# Patient Record
Sex: MALE | Race: WHITE | Employment: FULL TIME | URBAN - METROPOLITAN AREA
[De-identification: names, ages, dates, MRNs, and addresses within clinical notes are randomized per-mention and may not be internally consistent; named-entity substitution may affect disease eponyms.]

---

## 2022-05-03 ENCOUNTER — OFFICE VISIT (OUTPATIENT)
Dept: PRIMARY CARE CLINIC | Age: 42
End: 2022-05-03
Payer: OTHER GOVERNMENT

## 2022-05-03 VITALS
BODY MASS INDEX: 31.06 KG/M2 | TEMPERATURE: 98.4 F | WEIGHT: 242 LBS | OXYGEN SATURATION: 99 % | DIASTOLIC BLOOD PRESSURE: 81 MMHG | HEIGHT: 74 IN | HEART RATE: 91 BPM | SYSTOLIC BLOOD PRESSURE: 137 MMHG

## 2022-05-03 DIAGNOSIS — R06.09 DOE (DYSPNEA ON EXERTION): Primary | ICD-10-CM

## 2022-05-03 DIAGNOSIS — Z86.69 HISTORY OF MIGRAINE: ICD-10-CM

## 2022-05-03 DIAGNOSIS — E66.09 CLASS 1 OBESITY DUE TO EXCESS CALORIES WITHOUT SERIOUS COMORBIDITY WITH BODY MASS INDEX (BMI) OF 31.0 TO 31.9 IN ADULT: ICD-10-CM

## 2022-05-03 DIAGNOSIS — Z13.220 SCREENING FOR LIPID DISORDERS: ICD-10-CM

## 2022-05-03 DIAGNOSIS — R41.89 SLUGGISHNESS: ICD-10-CM

## 2022-05-03 DIAGNOSIS — F43.10 PTSD (POST-TRAUMATIC STRESS DISORDER): ICD-10-CM

## 2022-05-03 DIAGNOSIS — R00.2 HEART PALPITATIONS: ICD-10-CM

## 2022-05-03 DIAGNOSIS — F98.8 ATTENTION DEFICIT DISORDER (ADD) IN ADULT: ICD-10-CM

## 2022-05-03 DIAGNOSIS — R42 EPISODIC LIGHTHEADEDNESS: ICD-10-CM

## 2022-05-03 DIAGNOSIS — Z86.16 HISTORY OF COVID-19: ICD-10-CM

## 2022-05-03 DIAGNOSIS — J30.9 CHRONIC ALLERGIC RHINITIS: ICD-10-CM

## 2022-05-03 PROBLEM — F33.0 MAJOR DEPRESSIVE DISORDER, RECURRENT EPISODE, MILD (HCC): Status: ACTIVE | Noted: 2022-01-27

## 2022-05-03 PROBLEM — F90.9 ATTENTION DEFICIT HYPERACTIVITY DISORDER (ADHD): Status: ACTIVE | Noted: 2022-01-27

## 2022-05-03 PROCEDURE — 99204 OFFICE O/P NEW MOD 45 MIN: CPT | Performed by: FAMILY MEDICINE

## 2022-05-03 RX ORDER — PRAZOSIN HYDROCHLORIDE 1 MG/1
1 CAPSULE ORAL NIGHTLY
COMMUNITY

## 2022-05-03 RX ORDER — LORATADINE 10 MG/1
10 TABLET ORAL DAILY
COMMUNITY
Start: 2022-01-27

## 2022-05-03 RX ORDER — SUMATRIPTAN 50 MG/1
50 TABLET, FILM COATED ORAL PRN
COMMUNITY

## 2022-05-03 RX ORDER — DEXTROAMPHETAMINE SACCHARATE, AMPHETAMINE ASPARTATE, DEXTROAMPHETAMINE SULFATE AND AMPHETAMINE SULFATE 5; 5; 5; 5 MG/1; MG/1; MG/1; MG/1
20 TABLET ORAL DAILY
COMMUNITY
Start: 2021-10-15

## 2022-05-03 RX ORDER — FLUTICASONE PROPIONATE 50 MCG
2 SPRAY, SUSPENSION (ML) NASAL 2 TIMES DAILY
COMMUNITY
Start: 2022-01-27

## 2022-05-03 RX ORDER — SERTRALINE HYDROCHLORIDE 100 MG/1
100 TABLET, FILM COATED ORAL DAILY
COMMUNITY

## 2022-05-03 SDOH — ECONOMIC STABILITY: FOOD INSECURITY: WITHIN THE PAST 12 MONTHS, YOU WORRIED THAT YOUR FOOD WOULD RUN OUT BEFORE YOU GOT MONEY TO BUY MORE.: NEVER TRUE

## 2022-05-03 SDOH — ECONOMIC STABILITY: FOOD INSECURITY: WITHIN THE PAST 12 MONTHS, THE FOOD YOU BOUGHT JUST DIDN'T LAST AND YOU DIDN'T HAVE MONEY TO GET MORE.: NEVER TRUE

## 2022-05-03 ASSESSMENT — SOCIAL DETERMINANTS OF HEALTH (SDOH): HOW HARD IS IT FOR YOU TO PAY FOR THE VERY BASICS LIKE FOOD, HOUSING, MEDICAL CARE, AND HEATING?: NOT HARD AT ALL

## 2022-05-03 ASSESSMENT — PATIENT HEALTH QUESTIONNAIRE - PHQ9
SUM OF ALL RESPONSES TO PHQ QUESTIONS 1-9: 0
1. LITTLE INTEREST OR PLEASURE IN DOING THINGS: 0
SUM OF ALL RESPONSES TO PHQ QUESTIONS 1-9: 0
SUM OF ALL RESPONSES TO PHQ QUESTIONS 1-9: 0
SUM OF ALL RESPONSES TO PHQ9 QUESTIONS 1 & 2: 0
2. FEELING DOWN, DEPRESSED OR HOPELESS: 0
SUM OF ALL RESPONSES TO PHQ QUESTIONS 1-9: 0

## 2022-05-03 NOTE — PROGRESS NOTES
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 Relocated from [facility] recently. He is originally from [place], [place]. He does report a few medications that he has been on for some time. He has been treated for PTSD, allergies and ADD. What has him concerned currently is:    Has been experiencing for a few months feeling like with exertion (stairs) feeling very winded. When he gets up to fast feels brief feeling of heart pounding/racing - lasts 30-60 seconds. Used to run a lot, but now these symptoms limit exercise, does endorse some deconditioning. No syncopal events. These symptoms are happening more frequently. No major new stressors. No labs for at least 1 year. No heart eval in the past. Had asthma as a child, but no issues breathing in adulthood. Change in fatigue. No change in urine/thirst. He had COVID (vaccinated with 2 shots) around [address] Moderate illness reported with that. Current meds and dosages have been stable. Normally used Adderall M-F. No correlation with above to this med. PHQ Scores 5/3/2022   PHQ2 Score 0   PHQ9 Score 0     Interpretation of Total Score Depression Severity: 1-4 = Minimal depression, 5-9 = Mild depression, 10-14 = Moderate depression, 15-19 = Moderately severe depression, 20-27 = Severe depression     Prior to Visit Medications    Medication Sig Taking? Authorizing Provider   amphetamine-dextroamphetamine (ADDERALL) 20 MG tablet Take 20 mg by mouth daily.  Yes Historical Provider, MD   sertraline (ZOLOFT) 100 MG tablet Take 100 mg by mouth daily Yes Historical Provider, MD   prazosin (MINIPRESS) 1 MG capsule Take 1 mg by mouth nightly Yes Historical Provider, MD   fluticasone (FLONASE) 50 MCG/ACT nasal spray 2 sprays by Nasal route 2 times daily Yes Historical Provider, MD   loratadine (CLARITIN) 10 MG tablet Take 10 mg by mouth daily Yes Historical Provider, MD   SUMAtriptan (IMITREX) 50 MG tablet Take 50 mg by mouth as needed Yes Historical Provider, MD       Past Medical History:   Diagnosis Date    Attention deficit disorder (ADD) in adult 2022    Chronic allergic rhinitis 2022    History of COVID-19 2022 5/3/2022    History of migraine 5/3/2022    PTSD with nightmares 5/3/2022        Social History     Tobacco Use    Smoking status: Former Smoker     Packs/day: 0.50     Years: 15.00     Pack years: 7.50     Types: Cigarettes     Quit date: 5/3/2020     Years since quittin.0    Smokeless tobacco: Current User   Vaping Use    Vaping Use: Every day    Substances: Nicotine   Substance Use Topics    Alcohol use: Yes     Comment: social    Drug use: Never        Past Surgical History:   Procedure Laterality Date    APPENDECTOMY      SHOULDER SURGERY Right     SALP        No Known Allergies     Family History   Problem Relation Age of Onset    High Blood Pressure Mother     Other Father         Pain Med addiction    No Known Problems Brother     Cancer Paternal Grandmother         Leukemia    Heart Disease Paternal Grandfather         Patient's past medical history, surgical history, family history, medications, and allergies  were all reviewed and updated as appropriate today. Review of systems per HPI above, otherwise negative. /81   Pulse 91   Temp 98.4 °F (36.9 °C)   Ht 6' 2\" (1.88 m)   Wt 242 lb (109.8 kg)   SpO2 99%   BMI 31.07 kg/m²      Physical Exam  Vitals reviewed. Constitutional:       General: He is not in acute distress. Appearance: Normal appearance. He is well-developed. HENT:      Head: Normocephalic and atraumatic.       Right Ear: Tympanic membrane and ear canal normal. No drainage. No middle ear effusion. Tympanic membrane is not erythematous. Left Ear: Tympanic membrane and ear canal normal. No drainage. No middle ear effusion. Tympanic membrane is not erythematous. Nose: Nose normal. No rhinorrhea. Mouth/Throat:      Mouth: Mucous membranes are moist.      Pharynx: No oropharyngeal exudate or posterior oropharyngeal erythema. Eyes:      Extraocular Movements: Extraocular movements intact. Pupils: Pupils are equal, round, and reactive to light. Neck:      Thyroid: No thyromegaly. Cardiovascular:      Rate and Rhythm: Normal rate and regular rhythm. Heart sounds: No murmur heard. Pulmonary:      Effort: Pulmonary effort is normal.      Breath sounds: Normal breath sounds. No wheezing. Abdominal:      General: Bowel sounds are normal.      Palpations: Abdomen is soft. There is no mass. Tenderness: There is no abdominal tenderness. Musculoskeletal:         General: No swelling. Normal range of motion. Cervical back: Neck supple. Lymphadenopathy:      Cervical: No cervical adenopathy. Skin:     General: Skin is warm and dry. Findings: No rash. Neurological:      General: No focal deficit present. Mental Status: He is alert and oriented to person, place, and time. Cranial Nerves: No cranial nerve deficit. Psychiatric:         Mood and Affect: Mood normal.         Assessment:  Encounter Diagnoses   Name Primary?  MIDDLETON (dyspnea on exertion) Yes    Episodic lightheadedness     Heart palpitations     History of COVID-19 01/2022     Sluggishness     Attention deficit disorder (ADD) in adult     PTSD with nightmares     Chronic allergic rhinitis     History of migraine     Screening for lipid disorders     Class 1 obesity due to excess calories without serious comorbidity with body mass index (BMI) of 31.0 to 31.9 in adult        Plan:  1.  MIDDLETON (dyspnea on exertion)  NP to practice with various vague symptoms as described above in HPI. Exam is normal. By hx hard to determine any specific driving force for this, but I think eval is warranted. Will get labs first. We discussed possible Cardiology eval, but will await labs first.   - CBC with Auto Differential; Future  - Comprehensive Metabolic Panel, Fasting; Future    2. Episodic lightheadedness  See above. - CBC with Auto Differential; Future  - Comprehensive Metabolic Panel, Fasting; Future    3. Heart palpitations  - CBC with Auto Differential; Future  - Comprehensive Metabolic Panel, Fasting; Future  - TSH; Future  - T4, Free; Future  - T3, Free; Future    4. History of COVID-19 01/2022  Had a moderate COVID illness in [place] this year, symptoms above started about 2 months after recovery. Consider COVID as trigger for this? 5. Sluggishness  - Hemoglobin A1C; Future  - CBC with Auto Differential; Future  - Comprehensive Metabolic Panel, Fasting; Future  - TSH; Future  - T4, Free; Future  - T3, Free; Future    6. Attention deficit disorder (ADD) in adult  Continue per specialist.    7. PTSD with nightmares  Will agree to refill Prazosin for him. 8. Chronic allergic rhinitis  Symptoms c/w allergies - environmental VS seasonal. Extensive education provided today regarding trigger avoidance and safe management with OTC medications. Provided answers to all questions today. Given precautions regarding new/progressive symptoms that are not responsive to medications that were discussed today. Warning signs of infection to ears, nose and sinuses given. 9. History of migraine    10. Screening for lipid disorders  - Lipid, Fasting; Future    11. Class 1 obesity due to excess calories without serious comorbidity with body mass index (BMI) of 31.0 to 31.9 in adult  Patient was asked about current diet and exercise habits, and personalized advice was provided regarding recommended lifestyle changes.   Patient's comorbid health conditions associated with elevated BMI were discussed, as well as the likely benefits weight loss. Based upon patient's motivation to change behavior, the following plan was agreed upon to work toward a weight loss goal - increasing CV activity, reducing carbs/calories and healthy eating habits. Educational materials for weight loss were provided. Patient will follow-up with myself at designated time in future. - Hemoglobin A1C; Future  - Lipid, Fasting; Future      Return if symptoms worsen or fail to improve. Vonda Perez, DO     Please note that this chart was generated using dragon dictation software. Although every effort was made to ensure the accuracy of this automated transcription, some errors in transcription may have occurred.

## 2022-05-16 ENCOUNTER — TELEPHONE (OUTPATIENT)
Dept: PRIMARY CARE CLINIC | Age: 42
End: 2022-05-16

## 2022-05-16 ENCOUNTER — HOSPITAL ENCOUNTER (EMERGENCY)
Age: 42
Discharge: HOME | End: 2022-05-17
Attending: EMERGENCY MEDICINE
Payer: OTHER GOVERNMENT

## 2022-05-16 DIAGNOSIS — D64.9 ANEMIA, UNSPECIFIED TYPE: Primary | ICD-10-CM

## 2022-05-16 LAB
A/G RATIO: 1.6 (ref 1.1–2.2)
ABO/RH: NORMAL
ALBUMIN SERPL-MCNC: 4.6 G/DL (ref 3.4–5)
ALP BLD-CCNC: 52 U/L (ref 40–129)
ALT SERPL-CCNC: 12 U/L (ref 10–40)
ANION GAP SERPL CALCULATED.3IONS-SCNC: 10 MMOL/L (ref 3–16)
ANTIBODY SCREEN: NORMAL
AST SERPL-CCNC: 18 U/L (ref 15–37)
BASOPHILS ABSOLUTE: 0.1 K/UL (ref 0–0.2)
BASOPHILS RELATIVE PERCENT: 3.1 %
BILIRUB SERPL-MCNC: 0.6 MG/DL (ref 0–1)
BLOOD BANK DISPENSE STATUS: NORMAL
BLOOD BANK PRODUCT CODE: NORMAL
BLOOD SMEAR REVIEW: NORMAL
BPU ID: NORMAL
BUN BLDV-MCNC: 10 MG/DL (ref 7–20)
CALCIUM SERPL-MCNC: 9 MG/DL (ref 8.3–10.6)
CHLORIDE BLD-SCNC: 102 MMOL/L (ref 99–110)
CO2: 24 MMOL/L (ref 21–32)
CREAT SERPL-MCNC: 0.7 MG/DL (ref 0.9–1.3)
DESCRIPTION BLOOD BANK: NORMAL
EKG ATRIAL RATE: 79 BPM
EKG DIAGNOSIS: NORMAL
EKG P AXIS: 26 DEGREES
EKG P-R INTERVAL: 158 MS
EKG Q-T INTERVAL: 384 MS
EKG QRS DURATION: 90 MS
EKG QTC CALCULATION (BAZETT): 440 MS
EKG R AXIS: 1 DEGREES
EKG T AXIS: 29 DEGREES
EKG VENTRICULAR RATE: 79 BPM
EOSINOPHILS ABSOLUTE: 0.1 K/UL (ref 0–0.6)
EOSINOPHILS RELATIVE PERCENT: 1.9 %
FERRITIN: 21.7 NG/ML (ref 30–400)
GFR AFRICAN AMERICAN: >60
GFR NON-AFRICAN AMERICAN: >60
GLUCOSE BLD-MCNC: 93 MG/DL (ref 70–99)
HCT VFR BLD CALC: 20.3 % (ref 40.5–52.5)
HCT VFR BLD CALC: 24 % (ref 40.5–52.5)
HEMOGLOBIN: 5.5 G/DL (ref 13.5–17.5)
HEMOGLOBIN: 7 G/DL (ref 13.5–17.5)
LACTATE DEHYDROGENASE: 157 U/L (ref 100–190)
LYMPHOCYTES ABSOLUTE: 1.7 K/UL (ref 1–5.1)
LYMPHOCYTES RELATIVE PERCENT: 37.5 %
MAGNESIUM: 1.9 MG/DL (ref 1.8–2.4)
MCH RBC QN AUTO: 14.3 PG (ref 26–34)
MCHC RBC AUTO-ENTMCNC: 26.9 G/DL (ref 31–36)
MCV RBC AUTO: 53.3 FL (ref 80–100)
MONOCYTES ABSOLUTE: 0.4 K/UL (ref 0–1.3)
MONOCYTES RELATIVE PERCENT: 8.2 %
NEUTROPHILS ABSOLUTE: 2.2 K/UL (ref 1.7–7.7)
NEUTROPHILS RELATIVE PERCENT: 49.3 %
PDW BLD-RTO: 22.5 % (ref 12.4–15.4)
PLATELET # BLD: 288 K/UL (ref 135–450)
PMV BLD AUTO: 8.4 FL (ref 5–10.5)
POTASSIUM REFLEX MAGNESIUM: 3.5 MMOL/L (ref 3.5–5.1)
RBC # BLD: 3.82 M/UL (ref 4.2–5.9)
SODIUM BLD-SCNC: 136 MMOL/L (ref 136–145)
TOTAL PROTEIN: 7.5 G/DL (ref 6.4–8.2)
WBC # BLD: 4.5 K/UL (ref 4–11)

## 2022-05-16 PROCEDURE — 83010 ASSAY OF HAPTOGLOBIN QUANT: CPT

## 2022-05-16 PROCEDURE — 83615 LACTATE (LD) (LDH) ENZYME: CPT

## 2022-05-16 PROCEDURE — 83540 ASSAY OF IRON: CPT

## 2022-05-16 PROCEDURE — 83550 IRON BINDING TEST: CPT

## 2022-05-16 PROCEDURE — 86923 COMPATIBILITY TEST ELECTRIC: CPT

## 2022-05-16 PROCEDURE — 36430 TRANSFUSION BLD/BLD COMPNT: CPT

## 2022-05-16 PROCEDURE — 86901 BLOOD TYPING SEROLOGIC RH(D): CPT

## 2022-05-16 PROCEDURE — 85018 HEMOGLOBIN: CPT

## 2022-05-16 PROCEDURE — 85014 HEMATOCRIT: CPT

## 2022-05-16 PROCEDURE — 80053 COMPREHEN METABOLIC PANEL: CPT

## 2022-05-16 PROCEDURE — 83655 ASSAY OF LEAD: CPT

## 2022-05-16 PROCEDURE — 85025 COMPLETE CBC W/AUTO DIFF WBC: CPT

## 2022-05-16 PROCEDURE — 93005 ELECTROCARDIOGRAM TRACING: CPT | Performed by: EMERGENCY MEDICINE

## 2022-05-16 PROCEDURE — 99285 EMERGENCY DEPT VISIT HI MDM: CPT

## 2022-05-16 PROCEDURE — 82728 ASSAY OF FERRITIN: CPT

## 2022-05-16 PROCEDURE — P9016 RBC LEUKOCYTES REDUCED: HCPCS

## 2022-05-16 PROCEDURE — 86850 RBC ANTIBODY SCREEN: CPT

## 2022-05-16 PROCEDURE — 86900 BLOOD TYPING SEROLOGIC ABO: CPT

## 2022-05-16 PROCEDURE — 83735 ASSAY OF MAGNESIUM: CPT

## 2022-05-16 PROCEDURE — 36415 COLL VENOUS BLD VENIPUNCTURE: CPT

## 2022-05-16 RX ORDER — SODIUM CHLORIDE 9 MG/ML
INJECTION, SOLUTION INTRAVENOUS PRN
Status: DISCONTINUED | OUTPATIENT
Start: 2022-05-16 | End: 2022-05-17 | Stop reason: HOSPADM

## 2022-05-16 ASSESSMENT — PAIN - FUNCTIONAL ASSESSMENT: PAIN_FUNCTIONAL_ASSESSMENT: NONE - DENIES PAIN

## 2022-05-16 NOTE — ED NOTES
Blood completed will redraw H&H per order.  . Pt updated on [address], RN  05/16/22 [id number]

## 2022-05-16 NOTE — TELEPHONE ENCOUNTER
Spoke to pt - he will be going to [facility] ER. No new symptoms to report. Spoke to provider at ER.

## 2022-05-16 NOTE — CONSENT
Informed Consent for Blood Component Transfusion Note    I have discussed with the patient the rationale for blood component transfusion; its benefits in treating or preventing fatigue, organ damage, or death; and its risk which includes mild transfusion reactions, rare risk of blood borne infection, or more serious but rare reactions. I have discussed the alternatives to transfusion, including the risk and consequences of not receiving transfusion. The patient had an opportunity to ask questions and had agreed to proceed with transfusion of blood components.     Electronically signed by Chelsea Hernández MD on 5/16/22 at 1:25 PM EDT

## 2022-05-16 NOTE — ED NOTES
1st unit of PRBCs started [address] [facility], [address] [place] [address]  05/16/22 [id number]

## 2022-05-16 NOTE — ED PROVIDER NOTES
[address]          ATTENDING PHYSICIAN NOTE       Date of evaluation: 5/16/2022    Chief Complaint     Anemia (per pt hgb 5. something per PCP recent lab work. Pt states was getting dizzy spells and shortly out of breathe)      History of Present Illness     Christina Paniagua is a 46 y.o. male with past medical history significant for migraines, previous COVID infection in January of this year, and hemorrhoids who presents to the emergency department in referral from his primary care provider for further evaluation of newly diagnosed anemia. Patient establish care with a new primary care provider on May 3 and was at that time complaining of shortness of breath and palpitations. Symptoms had been ongoing for several months, worse with exertion, and exercise limiting. Patient denied chest pain, syncope. He admits that he has had issues with bleeding hemorrhoids, but has been taking fiber supplements and over the past 6 months has only had about 1 episode of bleeding (blood staining the water) about 1 week ago, but none since. Otherwise reports no type of bleeding. Reports that his mother also had acquired transfusions over her life, but this was also attributed in her case to bleeding hemorrhoids. Both his current residence of 1 year and the previous residence before that were newer constructions with no known lead paint. Does not follow any particular dietary restrictions. Review of Systems     Review of Systems    Pertinent positive and negative findings as documented in the HPI, otherwise other systems were reviewed and were negative. Past Medical, Surgical, Family, and Social History     He has a past medical history of Attention deficit disorder (ADD) in adult, Bleeding hemorrhoid, Chronic allergic rhinitis, History of COVID-19 01/2022, History of migraine, and PTSD with nightmares.   He has a past surgical history that includes shoulder surgery (Right) and Appendectomy. His family history includes Cancer in his paternal grandmother; Heart Disease in his paternal grandfather; High Blood Pressure in his mother; No Known Problems in his brother; Other in his father. He reports that he quit smoking about 2 years ago. His smoking use included cigarettes. He has a 7.50 pack-year smoking history. He uses smokeless tobacco. He reports current alcohol use. He reports that he does not use drugs. Medications     Previous Medications    AMPHETAMINE-DEXTROAMPHETAMINE (ADDERALL) 20 MG TABLET    Take 20 mg by mouth daily. FLUTICASONE (FLONASE) 50 MCG/ACT NASAL SPRAY    2 sprays by Nasal route 2 times daily    LORATADINE (CLARITIN) 10 MG TABLET    Take 10 mg by mouth daily    PRAZOSIN (MINIPRESS) 1 MG CAPSULE    Take 1 mg by mouth nightly    SERTRALINE (ZOLOFT) 100 MG TABLET    Take 100 mg by mouth daily    SUMATRIPTAN (IMITREX) 50 MG TABLET    Take 50 mg by mouth as needed       Allergies     He has No Known Allergies. Physical Exam     INITIAL VITALS: BP: (!) 150/88, Temp: 98 °F (36.7 °C), Pulse: 86, Resp: 18, SpO2: 98 %   Physical Exam    General: Well developed and well nourished. No acute distress. HEENT: NCAT, PERRL, moist mucous membranes. Conjunctival and sublingual pallor. Neck: Trachea midline, neck supple with FROM  Heart: Regular rate and rhythm. 2/6 systolic murmur best heard at right second interspace. Lungs: Clear to auscultation in all fields bilaterally. Normal effort. Abd: Nondistended, no signs of trauma. No tenderness to palpation, guarding, or rebound. External hemorrhoid is noted with no active bleeding. No gross blood on digital rectal exam.  MSK: No obvious deformities. Range of motion grossly intact. Extremities: No cyanosis or edema. Peripheral pulses intact. Skin: No rashes, abrasions, contusions, or lacerations noted. Neuro: Alert and oriented, moves all extremities spontaneously. No gross motor or sensory deficits.   Psych: Mood and 21.7 (L) 30.0 - 400.0 ng/mL   Magnesium   Result Value Ref Range    Magnesium 1.90 1.80 - 2.40 mg/dL   Hemoglobin and Hematocrit   Result Value Ref Range    Hemoglobin 7.0 (L) 13.5 - 17.5 g/dL    Hematocrit 24.0 (L) 40.5 - 52.5 %   EKG 12 Lead   Result Value Ref Range    Ventricular Rate 79 BPM    Atrial Rate 79 BPM    P-R Interval 158 ms    QRS Duration 90 ms    Q-T Interval 384 ms    QTc Calculation (Bazett) 440 ms    P Axis 26 degrees    R Axis 1 degrees    T Axis 29 degrees    Diagnosis       EKG performed in ER and to be interpreted by ER physician. Confirmed by MD, ER ([id number]),  Baljeet Mckee ([id number]) on 5/16/2022 1:10:43 PM   TYPE AND SCREEN   Result Value Ref Range    ABO/Rh O POS     Antibody Screen NEG    PREPARE RBC (CROSSMATCH), 2 Units   Result Value Ref Range    Product Code Blood Bank [id number]     Description Blood Bank Red Blood Cells, Apheresis, Leuko-reduced     Unit Number [id number]     Dispense Status Blood Bank transfused     Product Code Blood Bank [id number]     Description Blood Bank Red Blood Cells, Leuko-reduced     Unit Number [id number]     Dispense Status Blood Bank transfused     Product Code Blood Bank [id number]     Description Blood Bank Red Blood Cells, Apheresis, Leuko-reduced     Unit Number [id number]     Dispense Status Blood Bank transfused        ED BEDSIDE ULTRASOUND:  None    RECENT VITALS:  BP: (!) 128/91,Temp: 98.1 °F (36.7 °C), Pulse: 71, Resp: 20, SpO2: 98 %     Procedures     None    ED Course     Nursing Notes, Past Medical Hx, Past Surgical Hx, Social Hx,Allergies, and Family Hx were reviewed.     patient was given the following medications:  Orders Placed This Encounter   Medications    0.9 % sodium chloride infusion       CONSULTS:  IP CONSULT TO PRIMARY CARE PROVIDER    MEDICAL DECISIONMAKING / ASSESSMENT / Carmen Maria Victoria is a 46 y.o. male with past medical history of hemorrhoids who presents to the emergency department with a new diagnosis of anemia. On presentation the patient was afebrile, hemodynamically stable, and in no acute distress. His exam was notable for some conjunctival and sublingual pallor, soft and nontender abdomen, external hemorrhoid but no gross blood on digital rectal exam.    I verified that the low hemoglobin was a true result by repeating the CBC. Hemoglobin is 5.5. MCV is also low as is ferritin. The remaining iron studies are in process and not expected to result during this ED visit but should facilitate outpatient work-up. Hemolysis labs are negative. At this point with a microcytic anemia, low ferritin, and negative hemolysis, this appears to be consistent with an iron deficiency anemia. Likely secondary to chronic ongoing losses from his hemorrhoids, but there are elements of the work-up that remain. I discussed his case over the phone with the patient's primary care nurse practitioner. They stated that he would be able to be seen within the next 2 days for repeat check. I felt that this was a reasonable plan given his hemodynamic stability and history indicating that this is likely a chronic process developing over time. Patient was initially transfused 2 units of RBCs and climbed from hemoglobin of 5.5 up to 7. At that point I ordered a third unit, which is being transfused at the time of shift change. At this time I am going off service will be signing out care to the oncoming provider. The responsibilities of the to reassess hemoglobin after transfusion and ultimate disposition, which as stated above I expect will be appropriate for home with return precautions and outpatient follow-up. Clinical Impression     1.  Anemia, unspecified type        Disposition     PATIENT REFERRED TO:  Alton Guzman, [address] [place] [address]  [phone]    Schedule an appointment as soon as possible for a visit in 2 days        DISCHARGE MEDICATIONS:  New Prescriptions    No medications on file       DISPOSITION  Pending at the time of shift change        Kaleb Gardner MD  05/16/22 5451

## 2022-05-17 ENCOUNTER — TELEPHONE (OUTPATIENT)
Dept: PRIMARY CARE CLINIC | Age: 42
End: 2022-05-17

## 2022-05-17 VITALS
OXYGEN SATURATION: 98 % | BODY MASS INDEX: 30.8 KG/M2 | HEIGHT: 74 IN | WEIGHT: 240 LBS | DIASTOLIC BLOOD PRESSURE: 90 MMHG | SYSTOLIC BLOOD PRESSURE: 125 MMHG | TEMPERATURE: 98.5 F | RESPIRATION RATE: 13 BRPM | HEART RATE: 60 BPM

## 2022-05-17 DIAGNOSIS — D64.9 SEVERE ANEMIA: Primary | ICD-10-CM

## 2022-05-17 LAB
HAPTOGLOBIN: 89 MG/DL (ref 30–200)
HCT VFR BLD CALC: 26.4 % (ref 40.5–52.5)
HEMOGLOBIN: 7.6 G/DL (ref 13.5–17.5)
IRON SATURATION: 3 % (ref 20–50)
IRON: 14 UG/DL (ref 59–158)
TOTAL IRON BINDING CAPACITY: 475 UG/DL (ref 260–445)

## 2022-05-17 PROCEDURE — 85018 HEMOGLOBIN: CPT

## 2022-05-17 PROCEDURE — 85014 HEMATOCRIT: CPT

## 2022-05-17 PROCEDURE — 36415 COLL VENOUS BLD VENIPUNCTURE: CPT

## 2022-05-17 RX ORDER — LANOLIN ALCOHOL/MO/W.PET/CERES
325 CREAM (GRAM) TOPICAL
Qty: 90 TABLET | Refills: 0 | Status: SHIPPED | OUTPATIENT
Start: 2022-05-17 | End: 2022-06-29 | Stop reason: SDUPTHER

## 2022-05-17 NOTE — ED NOTES
Discharge instructions provided to patient by RN at bedside. No further concerns addressed at this time.      Agustin Harris RN  05/17/22 2338

## 2022-05-17 NOTE — ED NOTES
RN spoke to lab on the phone as H & H not resulted at this time. RN and  found requisition not showing as it should.  aware and will run lab work accordingly.      Harika Bejarano RN  05/17/22 0715

## 2022-05-17 NOTE — TELEPHONE ENCOUNTER
Pt informed   He says he will try to do the labs at [facility] tomorrow between 7 and 7:30   Will then be scheduled for an appt here tomorrow afternoon -will call him back to schedule this .

## 2022-05-17 NOTE — TELEPHONE ENCOUNTER
Reviewed ER notes and labs - good improvements with the transfusion. What I would like to do before he gets out of town is have him go for STAT labs to repeat blood count tomorrow morning and seeing me a couple of hours later at earliest. Would like to have time for those to result and review. Let me know this can be coordinated and when I will see him. Will discuss f/u before he leaves town and come up with a game plan.

## 2022-05-17 NOTE — ED PROVIDER NOTES
[address] [facility] 71 ENCOUNTER          ATTENDING PHYSICIAN NOTE       Date of evaluation: 5/16/2022    ADDENDUM:      Care of this patient was assumed from Dr. Malou Mason.  The patient was seen for Anemia (per pt hgb 5. something per PCP recent lab work. Pt states was getting dizzy spells and shortly out of breathe)  . The patient's initial evaluation and plan have been discussed with the prior provider who initially evaluated the patient. Nursing Notes, Past Medical Hx, Past Surgical Hx, Social Hx, Allergies, and Family Hx were all reviewed. Patient is a 73-year-old male who presents to the emerge from for abnormal labs. Patient has noted increased fatigue and dyspnea on exertion and was seen by new primary care provider today and had blood work drawn that showed CBC with anemia 5.7. On repeat evaluation in the emerge from it was 5.5. Patient does note bleeding from an external hemorrhoid but otherwise no prior blood loss. Patient is hemodynamically stable. CBC shows a microcytic anemia that is most likely due to iron deficiency. Patient was given 2 units of red blood cells and repeat H&H after this was 7.0. At time of turnover, repeat H&H after third unit of packed red cells was pending. Diagnostic Results     EKG   EKG shows normal sinus rhythm with no acute ischemic abnormalities.     LABS:   Results for orders placed or performed during the hospital encounter of 05/16/22   CBC with Auto Differential   Result Value Ref Range    WBC 4.5 4.0 - 11.0 K/uL    RBC 3.82 (L) 4.20 - 5.90 M/uL    Hemoglobin 5.5 (LL) 13.5 - 17.5 g/dL    Hematocrit 20.3 (LL) 40.5 - 52.5 %    MCV 53.3 (L) 80.0 - 100.0 fL    MCH 14.3 (L) 26.0 - 34.0 pg    MCHC 26.9 (L) 31.0 - 36.0 g/dL    RDW 22.5 (H) 12.4 - 15.4 %    Platelets 052 689 - 494 K/uL    MPV 8.4 5.0 - 10.5 fL    Neutrophils % 49.3 %    Lymphocytes % 37.5 %    Monocytes % 8.2 %    Eosinophils % 1.9 %    Basophils % 3.1 %    Neutrophils Absolute 2.2 1.7 - 7.7 K/uL    Lymphocytes Absolute 1.7 1.0 - 5.1 K/uL    Monocytes Absolute 0.4 0.0 - 1.3 K/uL    Eosinophils Absolute 0.1 0.0 - 0.6 K/uL    Basophils Absolute 0.1 0.0 - 0.2 K/uL   Comprehensive Metabolic Panel w/ Reflex to MG   Result Value Ref Range    Sodium 136 136 - 145 mmol/L    Potassium reflex Magnesium 3.5 3.5 - 5.1 mmol/L    Chloride 102 99 - 110 mmol/L    CO2 24 21 - 32 mmol/L    Anion Gap 10 3 - 16    Glucose 93 70 - 99 mg/dL    BUN 10 7 - 20 mg/dL    CREATININE 0.7 (L) 0.9 - 1.3 mg/dL    GFR Non-African American >60 >60    GFR African American >60 >60    Calcium 9.0 8.3 - 10.6 mg/dL    Total Protein 7.5 6.4 - 8.2 g/dL    Albumin 4.6 3.4 - 5.0 g/dL    Albumin/Globulin Ratio 1.6 1.1 - 2.2    Total Bilirubin 0.6 0.0 - 1.0 mg/dL    Alkaline Phosphatase 52 40 - 129 U/L    ALT 12 10 - 40 U/L    AST 18 15 - 37 U/L   Lactate Dehydrogenase   Result Value Ref Range     100 - 190 U/L   Path Review, Smear   Result Value Ref Range    Blood Smear Review SLIDE READY    Ferritin   Result Value Ref Range    Ferritin 21.7 (L) 30.0 - 400.0 ng/mL   Magnesium   Result Value Ref Range    Magnesium 1.90 1.80 - 2.40 mg/dL   Hemoglobin and Hematocrit   Result Value Ref Range    Hemoglobin 7.0 (L) 13.5 - 17.5 g/dL    Hematocrit 24.0 (L) 40.5 - 52.5 %   Hemoglobin and Hematocrit   Result Value Ref Range    Hemoglobin 7.6 (L) 13.5 - 17.5 g/dL    Hematocrit 26.4 (L) 40.5 - 52.5 %   EKG 12 Lead   Result Value Ref Range    Ventricular Rate 79 BPM    Atrial Rate 79 BPM    P-R Interval 158 ms    QRS Duration 90 ms    Q-T Interval 384 ms    QTc Calculation (Bazett) 440 ms    P Axis 26 degrees    R Axis 1 degrees    T Axis 29 degrees    Diagnosis       EKG performed in ER and to be interpreted by ER physician. Confirmed by MD, ER ([id number]),  YUNIEL ANTUNEZ ([id number]) on 5/16/2022 1:10:43 PM   TYPE AND SCREEN   Result Value Ref Range    ABO/Rh O POS     Antibody Screen NEG    PREPARE RBC (CROSSMATCH), 2 Units   Result Value Ref Range    Product Code Blood Bank [id number]     Description Blood Bank Red Blood Cells, Apheresis, Leuko-reduced     Unit Number [id number]     Dispense Status Blood Bank transfused     Product Code Blood Bank [id number]     Description Blood Bank Red Blood Cells, Leuko-reduced     Unit Number [id number]     Dispense Status Blood Bank transfused     Product Code Blood Bank [id number]     Description Blood Bank Red Blood Cells, Apheresis, Leuko-reduced     Unit Number [id number]     Dispense Status Blood Bank transfused        RECENT VITALS:  BP: (!) 125/90, Temp: 98.5 °F (36.9 °C), Pulse: 60, Resp: 13, SpO2: 98 %     Procedures     N/A    ED Course     The patient was given the following medications:  Orders Placed This Encounter   Medications    0.9 % sodium chloride infusion    ferrous sulfate (FE TABS) 325 (65 Fe) MG EC tablet     Sig: Take 1 tablet by mouth 3 times daily (with meals)     Dispense:  90 tablet     Refill:  0       CONSULTS:  IP CONSULT TO PRIMARY CARE PROVIDER    MEDICAL DECISION MAKING / ASSESSMENT / Delmy Waleska is a 46 y.o. male who presents for abnormal hemoglobin in light of symptoms of fatigue and shortness of breath. Patient does note a hemorrhoid that will bleed but otherwise no obvious sources of blood loss. Patient has a microcytic anemia on exam which is likely due to iron deficiency. Patient was transfused with 3 units of packed red cells to a hemoglobin of 7.6. Since the patient has no active bleeding at this time and had appropriate response to transfusion and is hemodynamically stable I do not feel he needs admission to the hospital.  My colleague did speak with the patient's primary care provider and the patient will have close outpatient follow-up. I will start him on iron supplementation. Clinical Impression     1.  Anemia, unspecified type        Disposition     PATIENT REFERRED TO:  Elizabet Urban DO  [address] [place] [address]  [phone]    Schedule an appointment as soon as possible for a visit in 2 days        DISCHARGE MEDICATIONS:  New Prescriptions    FERROUS SULFATE (FE TABS) 325 (65 FE) MG EC TABLET    Take 1 tablet by mouth 3 times daily (with meals)       DISPOSITION Decision To Discharge 05/17/2022 02:41:07 AM       Donato Caal MD  05/17/22 1394

## 2022-05-18 ENCOUNTER — OFFICE VISIT (OUTPATIENT)
Dept: PRIMARY CARE CLINIC | Age: 42
End: 2022-05-18
Payer: OTHER GOVERNMENT

## 2022-05-18 VITALS
SYSTOLIC BLOOD PRESSURE: 133 MMHG | DIASTOLIC BLOOD PRESSURE: 64 MMHG | HEIGHT: 74 IN | OXYGEN SATURATION: 98 % | TEMPERATURE: 98 F | WEIGHT: 239 LBS | BODY MASS INDEX: 30.67 KG/M2 | HEART RATE: 94 BPM

## 2022-05-18 DIAGNOSIS — D50.9 IRON DEFICIENCY ANEMIA, UNSPECIFIED IRON DEFICIENCY ANEMIA TYPE: Primary | ICD-10-CM

## 2022-05-18 DIAGNOSIS — Z86.19 HISTORY OF HELICOBACTER PYLORI INFECTION: ICD-10-CM

## 2022-05-18 DIAGNOSIS — Z87.19 H/O HEMORRHOIDS: ICD-10-CM

## 2022-05-18 DIAGNOSIS — Z92.89 HISTORY OF RECENT BLOOD TRANSFUSION: ICD-10-CM

## 2022-05-18 DIAGNOSIS — K62.5 BRBPR (BRIGHT RED BLOOD PER RECTUM): ICD-10-CM

## 2022-05-18 DIAGNOSIS — D64.9 SEVERE ANEMIA: ICD-10-CM

## 2022-05-18 LAB
BASOPHILS ABSOLUTE: 0.2 K/UL (ref 0–0.2)
BASOPHILS RELATIVE PERCENT: 2.9 %
EOSINOPHILS ABSOLUTE: 0.2 K/UL (ref 0–0.6)
EOSINOPHILS RELATIVE PERCENT: 4.1 %
HCT VFR BLD CALC: 29.3 % (ref 40.5–52.5)
HEMATOLOGY PATH CONSULT: NO
HEMOGLOBIN: 8.5 G/DL (ref 13.5–17.5)
LYMPHOCYTES ABSOLUTE: 1.9 K/UL (ref 1–5.1)
LYMPHOCYTES RELATIVE PERCENT: 35.7 %
MCH RBC QN AUTO: 17.2 PG (ref 26–34)
MCHC RBC AUTO-ENTMCNC: 28.9 G/DL (ref 31–36)
MCV RBC AUTO: 59.7 FL (ref 80–100)
MONOCYTES ABSOLUTE: 0.5 K/UL (ref 0–1.3)
MONOCYTES RELATIVE PERCENT: 9.8 %
NEUTROPHILS ABSOLUTE: 2.6 K/UL (ref 1.7–7.7)
NEUTROPHILS RELATIVE PERCENT: 47.5 %
PDW BLD-RTO: 31.5 % (ref 12.4–15.4)
PLATELET # BLD: 287 K/UL (ref 135–450)
PMV BLD AUTO: 8.6 FL (ref 5–10.5)
RBC # BLD: 4.9 M/UL (ref 4.2–5.9)
WBC # BLD: 5.5 K/UL (ref 4–11)

## 2022-05-18 PROCEDURE — 99214 OFFICE O/P EST MOD 30 MIN: CPT | Performed by: FAMILY MEDICINE

## 2022-05-18 NOTE — PATIENT INSTRUCTIONS
[facility] Laboratory Locations - No appointment necessary. @ indicates the location is open Saturdays in addition to Monday through Friday. Call your preferred location for test preparation, business hours and other information you need. [facility] accepts [employer]. [facility]    @ [place] Lab Svcs. [address]   Ph: [phone] [facility] Lab Svcs. [address]   Ph: [phone]  @ [place] Lab Svcs. [address] [facility]   Ph: [phone]    [facility] Lab Svcs. [address]ovanny [address]   Ph: [phone] @ [place] [facility] Svcs. [address]  Ph: [phone] @ Cecilia [facility] Svcs. [address] [address] [facility] [address] Ricki Breen [place] [address]   Ph: [phone]    [place]   @ [facility] Lab Svcs. [address] [facility] Dominic Seals.[facility] [address]   Ph: [phone] [facility]. [address]  Ph: [address] [facility], [phone]   [facility] 30:  [address] Lab Svcs. 54 [facility]   Ph: [phone] [facility]. Lab Svcs.   [address] [facility], [facility] [address]   Ph: [phone]

## 2022-05-18 NOTE — Clinical Note
This is the young renetta that was severely anemic. Very nice renetta. Keep me posted!  Oswald Baron

## 2022-05-18 NOTE — PROGRESS NOTES
[address] [facility] [address] PRIMARY CARE  [address] [place] [id number]  Dept: [phone]  Dept Fax: [phone]     5/18/2022      Elba Juan Pablo   1980     Chief Complaint   Patient presents with    Follow-Up from Garett DINH  Pt comes in today for ER f/u. Was sent to ER 2 days ago for severe symptomatic anemia and received 3U PRBC. Tolerated this well. Started daily iron supplement. He is feeling better overall - much improved from last time we had seen him. This does seen to be a new issue for him, although years ago it sounds like he had a workup for anemia but it was mild at that time. Had a EGD/colonoscopy in Jan 2020 - told there was H pylori and treated for that. Heartburn present. Eats spicy foods. Does have hemorrhoids and does endorse frequent blood in water/with wiping. Hemoglobin   Date Value Ref Range Status   05/18/2022 8.5 (L) 13.5 - 17.5 g/dL Final     Comment:     Results confirmed, test repeated   05/17/2022 7.6 (L) 13.5 - 17.5 g/dL Final   05/16/2022 7.0 (L) 13.5 - 17.5 g/dL Final   05/16/2022 5.5 (LL) 13.5 - 17.5 g/dL Final   05/14/2022 5.7 (LL) 13.5 - 17.5 g/dL Final     Comment:     Results confirmed, test repeated     Hematocrit   Date Value Ref Range Status   05/18/2022 29.3 (L) 40.5 - 52.5 % Final   05/17/2022 26.4 (L) 40.5 - 52.5 % Final   05/16/2022 24.0 (L) 40.5 - 52.5 % Final   05/16/2022 20.3 (LL) 40.5 - 52.5 % Final   05/14/2022 21.2 (L) 40.5 - 52.5 % Final       PHQ Scores 5/3/2022   PHQ2 Score 0   PHQ9 Score 0     Interpretation of Total Score Depression Severity: 1-4 = Minimal depression, 5-9 = Mild depression, 10-14 = Moderate depression, 15-19 = Moderately severe depression, 20-27 = Severe depression     Prior to Visit Medications    Medication Sig Taking?  Authorizing Provider   ferrous sulfate (FE TABS) 325 (65 Fe) MG EC tablet Take 1 tablet by mouth 3 times daily (with meals) Yes Larry Hassan MD amphetamine-dextroamphetamine (ADDERALL) 20 MG tablet Take 20 mg by mouth daily.  Yes Historical Provider, MD   sertraline (ZOLOFT) 100 MG tablet Take 100 mg by mouth daily Yes Historical Provider, MD   prazosin (MINIPRESS) 1 MG capsule Take 1 mg by mouth nightly Yes Historical Provider, MD   fluticasone (FLONASE) 50 MCG/ACT nasal spray 2 sprays by Nasal route 2 times daily Yes Historical Provider, MD   loratadine (CLARITIN) 10 MG tablet Take 10 mg by mouth daily Yes Historical Provider, MD   SUMAtriptan (IMITREX) 50 MG tablet Take 50 mg by mouth as needed Yes Historical Provider, MD       Past Medical History:   Diagnosis Date    Attention deficit disorder (ADD) in adult 2022    Bleeding hemorrhoid     Chronic allergic rhinitis 2022    H/O hemorrhoids 2022    History of blood transfusion - 3 units in May 2022 2022    History of COVID-19 2022 5/3/2022    History of Helicobacter pylori infection 2022    History of migraine 5/3/2022    PTSD with nightmares 5/3/2022        Social History     Tobacco Use    Smoking status: Former Smoker     Packs/day: 0.50     Years: 15.00     Pack years: 7.50     Types: Cigarettes     Quit date: 5/3/2020     Years since quittin.0    Smokeless tobacco: Current User   Vaping Use    Vaping Use: Every day    Substances: Nicotine   Substance Use Topics    Alcohol use: Yes     Comment: social    Drug use: Never        Past Surgical History:   Procedure Laterality Date    APPENDECTOMY      SHOULDER SURGERY Right     SALP        No Known Allergies     Family History   Problem Relation Age of Onset    High Blood Pressure Mother     Other Father         Pain Med addiction    No Known Problems Brother     Cancer Paternal Grandmother         Leukemia    Heart Disease Paternal Grandfather         Patient's past medical history, surgical history, family history, medications, and allergies  were all reviewed and updated as appropriate today.    Review of Systems   Constitutional: Positive for fatigue (improved). Negative for fever and unexpected weight change. HENT: Negative for congestion, ear pain and sore throat. Eyes: Negative for pain, itching and visual disturbance. Respiratory: Negative for cough, shortness of breath and wheezing. Cardiovascular: Negative for chest pain, palpitations and leg swelling. Gastrointestinal: Positive for blood in stool. Negative for abdominal pain, constipation, diarrhea, nausea and vomiting. Endocrine: Negative for cold intolerance, heat intolerance, polydipsia and polyuria. Genitourinary: Negative for dysuria, frequency and hematuria. Musculoskeletal: Negative for arthralgias and joint swelling. Skin: Negative for rash. Neurological: Positive for dizziness (improved). Negative for headaches. /64 (Cuff Size: Medium Adult)   Pulse 94   Temp 98 °F (36.7 °C) (Temporal)   Ht 6' 2\" (1.88 m)   Wt 239 lb (108.4 kg)   SpO2 98% Comment: room air  BMI 30.69 kg/m²      Physical Exam  Vitals reviewed. Constitutional:       General: He is not in acute distress. HENT:      Head: Normocephalic. Nose: Nose normal.      Mouth/Throat:      Mouth: Mucous membranes are moist.   Eyes:      Extraocular Movements: Extraocular movements intact. Pupils: Pupils are equal, round, and reactive to light. Cardiovascular:      Rate and Rhythm: Normal rate and regular rhythm. Heart sounds: No murmur heard. Pulmonary:      Effort: Pulmonary effort is normal.      Breath sounds: Normal breath sounds. No wheezing. Abdominal:      General: Bowel sounds are normal. There is no distension. Palpations: Abdomen is soft. Tenderness: There is no abdominal tenderness. There is no guarding. Musculoskeletal:         General: No swelling or deformity. Cervical back: Neck supple. Lymphadenopathy:      Cervical: No cervical adenopathy.    Skin:     General: Skin is warm and dry.      Findings: No rash. Neurological:      Mental Status: He is alert and oriented to person, place, and time. Cranial Nerves: No cranial nerve deficit. Psychiatric:         Mood and Affect: Mood normal.         Behavior: Behavior is cooperative. Assessment:  Encounter Diagnoses   Name Primary?  Iron deficiency anemia, unspecified iron deficiency anemia type Yes    History of recent blood transfusion     H/O hemorrhoids     BRBPR (bright red blood per rectum)     History of Helicobacter pylori infection        Plan:  1. Iron deficiency anemia, unspecified iron deficiency anemia type  Severe - improved s/p transfusion. Continue oral iron. Need to identify cause for best short/long term plan. Will refer to HEME and GI. Given precautions and answered all questions. F/u in 2 months. - Hans Hines MD, Oncology, Ceferino Cool MD, Gastroenterology, [facility]    2. History of recent blood transfusion    3. H/O hemorrhoids  - ERVIN Su MD, Gastroenterology, [facility]    4. BRBPR (bright red blood per rectum)  - ERVIN Su MD, Gastroenterology, [facility]    5. History of Helicobacter pylori infection  - ERVIN Su MD, Gastroenterology, [facility]    Total time spent: Over 30 minutes. This includes time spent with the patient during the visit as well as time spent before and after the visit reviewing the chart, reviewing past/present studies and documenting the encounter. Lengthy discussion with pt and wife today. Answered a lot of questions for them. They are to travel to [facility] tomorrow for the weekend - counseled pt I believe this is safe, but be mindful of how he is feeling with elevation change. Return in about 6 weeks (around 6/29/2022) for F/U anemia. Kristin Benavides, DO     Please note that this chart was generated using dragon dictation software.   Although every effort was made to ensure the accuracy of this automated transcription, some errors in transcription may have occurred.

## 2022-05-19 LAB — LEAD LEVEL BLOOD: <2 UG/DL

## 2022-05-19 ASSESSMENT — ENCOUNTER SYMPTOMS
EYE ITCHING: 0
DIARRHEA: 0
WHEEZING: 0
NAUSEA: 0
CONSTIPATION: 0
ABDOMINAL PAIN: 0
SHORTNESS OF BREATH: 0
BLOOD IN STOOL: 1
VOMITING: 0
COUGH: 0
EYE PAIN: 0
SORE THROAT: 0

## 2022-06-01 ENCOUNTER — TELEPHONE (OUTPATIENT)
Dept: PRIMARY CARE CLINIC | Age: 42
End: 2022-06-01

## 2022-06-01 DIAGNOSIS — D50.9 IRON DEFICIENCY ANEMIA, UNSPECIFIED IRON DEFICIENCY ANEMIA TYPE: Primary | ICD-10-CM

## 2022-06-01 NOTE — TELEPHONE ENCOUNTER
Roscoe Ortiz from [facility] calling for  referral for pt to see Dr Kimberly Reilly    He says they cannot give the pt an appt until referral rec'd

## 2022-06-28 ENCOUNTER — TELEPHONE (OUTPATIENT)
Dept: PRIMARY CARE CLINIC | Age: 42
End: 2022-06-28

## 2022-06-28 NOTE — TELEPHONE ENCOUNTER
Pt's wife calling saying  referred pt to a gastro and today she rec'd a bill from the gastro, called [facility], and was told there was no referral done    She is asking for a referral to be done to [facility]

## 2022-06-29 RX ORDER — LANOLIN ALCOHOL/MO/W.PET/CERES
325 CREAM (GRAM) TOPICAL
Qty: 90 TABLET | Refills: 0 | Status: SHIPPED | OUTPATIENT
Start: 2022-06-29

## 2022-07-07 ENCOUNTER — OFFICE VISIT (OUTPATIENT)
Dept: PRIMARY CARE CLINIC | Age: 42
End: 2022-07-07
Payer: OTHER GOVERNMENT

## 2022-07-07 VITALS
WEIGHT: 238 LBS | DIASTOLIC BLOOD PRESSURE: 70 MMHG | TEMPERATURE: 98.1 F | HEART RATE: 80 BPM | OXYGEN SATURATION: 98 % | BODY MASS INDEX: 30.56 KG/M2 | SYSTOLIC BLOOD PRESSURE: 109 MMHG

## 2022-07-07 DIAGNOSIS — K64.8: ICD-10-CM

## 2022-07-07 DIAGNOSIS — R06.09 DOE (DYSPNEA ON EXERTION): ICD-10-CM

## 2022-07-07 DIAGNOSIS — D50.9 IRON DEFICIENCY ANEMIA, UNSPECIFIED IRON DEFICIENCY ANEMIA TYPE: Primary | ICD-10-CM

## 2022-07-07 DIAGNOSIS — R41.89 SLUGGISHNESS: ICD-10-CM

## 2022-07-07 PROCEDURE — 99213 OFFICE O/P EST LOW 20 MIN: CPT | Performed by: FAMILY MEDICINE

## 2022-07-07 NOTE — PATIENT INSTRUCTIONS
[facility] Laboratory Locations - No appointment necessary. @ indicates the location is open Saturdays in addition to Monday through Friday. Call your preferred location for test preparation, business hours and other information you need. [employer] accepts [employer]. [facility]    @ [place] Lab Svcs. 3 [facility] [address] [place] [address]. [place], [address]   Ph: [phone] [facility] Lab Svcs. [address]   Ph: [phone]  @ [facility] Lab Svcs. [address] [facility]   Ph: [phone]    [facility] Lab Svcs. [address]   Ph: [phone] @ [place] Lab Svcs. [address]  Ph: [phone] @ [facility] Svcs. [address] [facility]. [address]i [address]   Ph: [phone]     Daniel [address] [place] [address]  Ph: [phone]    [place]   @ [place] Lab Svcs. [address] [place], [place] [address]   Ph: [phone] [facility]. Office [address] [place] [address]  Ph: [phone] [place] [address]   [facility]:  [address] Lab Svcs[address] 54 [facility]   Ph: [phone] [facility]. Lab Svcs.   [address] [facility], [facility]   Ph: [phone]

## 2022-07-07 NOTE — PROGRESS NOTES
[address] [facility] PRIMARY CARE  [address]  [address] [facility] [address]  Dept: [phone]  Dept Fax: [phone]     7/7/2022      Vonnie Cancino   1980     Chief Complaint   Patient presents with    Follow-up     catch up on what's been going on       HPI  Pt comes in today for f/u. Had EGD/Colonoscopy with Dr Emmy Tucker - banding and ligation large internal hemorrhoids. Seen by Heme, taking iron TID. Feeling significant improvements with energy and breathing. Has had some mild rectal bleeding since then. Having regular BMs. Having Bms once daily and stools larger/bulky. Just fiber for stools. PHQ Scores 5/3/2022   PHQ2 Score 0   PHQ9 Score 0     Interpretation of Total Score Depression Severity: 1-4 = Minimal depression, 5-9 = Mild depression, 10-14 = Moderate depression, 15-19 = Moderately severe depression, 20-27 = Severe depression     Prior to Visit Medications    Medication Sig Taking? Authorizing Provider   ferrous sulfate (FE TABS) 325 (65 Fe) MG EC tablet Take 1 tablet by mouth 3 times daily (with meals) Yes Deep Salazar APRN - CNP   amphetamine-dextroamphetamine (ADDERALL) 20 MG tablet Take 20 mg by mouth daily.  Yes Historical Provider, MD   sertraline (ZOLOFT) 100 MG tablet Take 100 mg by mouth daily Yes Historical Provider, MD   prazosin (MINIPRESS) 1 MG capsule Take 1 mg by mouth nightly Yes Historical Provider, MD   fluticasone (FLONASE) 50 MCG/ACT nasal spray 2 sprays by Nasal route 2 times daily Yes Historical Provider, MD   loratadine (CLARITIN) 10 MG tablet Take 10 mg by mouth daily Yes Historical Provider, MD   SUMAtriptan (IMITREX) 50 MG tablet Take 50 mg by mouth as needed Yes Historical Provider, MD       Past Medical History:   Diagnosis Date    Attention deficit disorder (ADD) in adult 1/27/2022    Bleeding hemorrhoid     Chronic allergic rhinitis 1/27/2022    H/O hemorrhoids 5/18/2022    History of blood transfusion - 3 units in May 2022 2022    History of COVID-19 2022 5/3/2022    History of Helicobacter pylori infection 2022    History of migraine 5/3/2022    PTSD with nightmares 5/3/2022        Social History     Tobacco Use    Smoking status: Former Smoker     Packs/day: 0.50     Years: 15.00     Pack years: 7.50     Types: Cigarettes     Quit date: 5/3/2020     Years since quittin.1    Smokeless tobacco: Current User   Vaping Use    Vaping Use: Every day    Substances: Nicotine   Substance Use Topics    Alcohol use: Yes     Comment: social    Drug use: Never        Past Surgical History:   Procedure Laterality Date    APPENDECTOMY      SHOULDER SURGERY Right     SALP        No Known Allergies     Family History   Problem Relation Age of Onset    High Blood Pressure Mother     Other Father         Pain Med addiction    No Known Problems Brother     Cancer Paternal Grandmother         Leukemia    Heart Disease Paternal Grandfather         Patient's past medical history, surgical history, family history, medications, and allergies  were all reviewed and updated as appropriate today. Review of Systems   Constitutional: Negative for fever. HENT: Negative for ear pain and sore throat. Respiratory: Negative for cough and shortness of breath. Cardiovascular: Negative for chest pain. Gastrointestinal: Positive for blood in stool (improved, now minimal). Negative for abdominal pain and nausea. Skin: Negative for rash. Neurological: Negative for dizziness and headaches. Hematological: Negative for adenopathy. /70 (Cuff Size: Medium Adult)   Pulse 80   Temp 98.1 °F (36.7 °C) (Temporal)   Wt 238 lb (108 kg)   SpO2 98% Comment: room air  BMI 30.56 kg/m²      Physical Exam  Vitals reviewed. Constitutional:       General: He is not in acute distress. HENT:      Head: Normocephalic.       Nose: Nose normal.      Mouth/Throat:      Mouth: Mucous membranes are moist.   Eyes:      Extraocular Movements: Extraocular movements intact. Pupils: Pupils are equal, round, and reactive to light. Cardiovascular:      Rate and Rhythm: Normal rate and regular rhythm. Heart sounds: No murmur heard. Pulmonary:      Effort: Pulmonary effort is normal.      Breath sounds: Normal breath sounds. No wheezing. Abdominal:      General: Bowel sounds are normal.      Palpations: Abdomen is soft. Tenderness: There is no abdominal tenderness. Musculoskeletal:         General: No swelling or deformity. Cervical back: Neck supple. Lymphadenopathy:      Cervical: No cervical adenopathy. Skin:     General: Skin is warm and dry. Findings: No rash. Neurological:      Mental Status: He is alert and oriented to person, place, and time. Cranial Nerves: No cranial nerve deficit. Psychiatric:         Mood and Affect: Mood normal.         Behavior: Behavior is cooperative. Assessment:  Encounter Diagnoses   Name Primary?  Iron deficiency anemia, unspecified iron deficiency anemia type Yes    Internal ulcerated hemorrhoids - s/p intervention     Sluggishness - RESOLVED     MIDDLETON (dyspnea on exertion) - RESOLVED        Plan:  1. Iron deficiency anemia, unspecified iron deficiency anemia type  Most recent lab value showed a hemoglobin near 11, has follow-up with hematology later this month. Follow-up per their recommendations. Continue to take iron now. 2. Internal ulcerated hemorrhoids - s/p intervention  Has had minimal rectal bleeding since intervention. He is needing to schedule follow-up with Dr. Sharita Davis. Await their long-term recommendations. 3. Sluggishness - RESOLVED    4. MIDDLETON (dyspnea on exertion) - RESOLVED      Return if symptoms worsen or fail to improve. Manish Solano, DO     Please note that this chart was generated using dragon dictation software.   Although every effort was made to ensure the accuracy of this automated transcription, some errors in transcription may have occurred.

## 2022-07-08 ASSESSMENT — ENCOUNTER SYMPTOMS
SORE THROAT: 0
BLOOD IN STOOL: 1
COUGH: 0
ABDOMINAL PAIN: 0
SHORTNESS OF BREATH: 0
NAUSEA: 0

## 2022-11-09 RX ORDER — LANOLIN ALCOHOL/MO/W.PET/CERES
325 CREAM (GRAM) TOPICAL
Qty: 90 TABLET | Refills: 5 | Status: SHIPPED | OUTPATIENT
Start: 2022-11-09

## 2023-07-14 ENCOUNTER — TELEPHONE (OUTPATIENT)
Dept: PRIMARY CARE CLINIC | Age: 43
End: 2023-07-14

## 2023-07-14 DIAGNOSIS — D50.9 IRON DEFICIENCY ANEMIA, UNSPECIFIED IRON DEFICIENCY ANEMIA TYPE: Primary | ICD-10-CM

## 2024-01-29 ASSESSMENT — PATIENT HEALTH QUESTIONNAIRE - PHQ9
1. LITTLE INTEREST OR PLEASURE IN DOING THINGS: 1
SUM OF ALL RESPONSES TO PHQ9 QUESTIONS 1 & 2: 2
SUM OF ALL RESPONSES TO PHQ QUESTIONS 1-9: 2
SUM OF ALL RESPONSES TO PHQ QUESTIONS 1-9: 2
1. LITTLE INTEREST OR PLEASURE IN DOING THINGS: SEVERAL DAYS
SUM OF ALL RESPONSES TO PHQ QUESTIONS 1-9: 2
SUM OF ALL RESPONSES TO PHQ QUESTIONS 1-9: 2
SUM OF ALL RESPONSES TO PHQ9 QUESTIONS 1 & 2: 2
2. FEELING DOWN, DEPRESSED OR HOPELESS: 1
2. FEELING DOWN, DEPRESSED OR HOPELESS: SEVERAL DAYS

## 2024-02-01 ENCOUNTER — OFFICE VISIT (OUTPATIENT)
Dept: PRIMARY CARE CLINIC | Age: 44
End: 2024-02-01

## 2024-02-01 VITALS
HEART RATE: 76 BPM | TEMPERATURE: 98.2 F | SYSTOLIC BLOOD PRESSURE: 131 MMHG | WEIGHT: 251 LBS | HEIGHT: 74 IN | OXYGEN SATURATION: 97 % | DIASTOLIC BLOOD PRESSURE: 81 MMHG | BODY MASS INDEX: 32.21 KG/M2

## 2024-02-01 DIAGNOSIS — H93.13 TINNITUS OF BOTH EARS: ICD-10-CM

## 2024-02-01 DIAGNOSIS — Z87.19 H/O HEMORRHOIDS: ICD-10-CM

## 2024-02-01 DIAGNOSIS — Z86.2 HISTORY OF ANEMIA: ICD-10-CM

## 2024-02-01 DIAGNOSIS — F43.10 PTSD (POST-TRAUMATIC STRESS DISORDER): ICD-10-CM

## 2024-02-01 DIAGNOSIS — Z86.69 HISTORY OF MIGRAINE: ICD-10-CM

## 2024-02-01 DIAGNOSIS — G47.00 FREQUENT NOCTURNAL AWAKENING: ICD-10-CM

## 2024-02-01 DIAGNOSIS — Z00.00 ANNUAL PHYSICAL EXAM: Primary | ICD-10-CM

## 2024-02-01 RX ORDER — PRAZOSIN HYDROCHLORIDE 1 MG/1
2 CAPSULE ORAL NIGHTLY
Qty: 30 CAPSULE | Refills: 0
Start: 2024-02-01

## 2024-02-01 RX ORDER — ZOLMITRIPTAN 5 MG/1
5 TABLET, FILM COATED ORAL PRN
COMMUNITY
Start: 2023-05-26

## 2024-02-01 SDOH — ECONOMIC STABILITY: FOOD INSECURITY: WITHIN THE PAST 12 MONTHS, THE FOOD YOU BOUGHT JUST DIDN'T LAST AND YOU DIDN'T HAVE MONEY TO GET MORE.: NEVER TRUE

## 2024-02-01 SDOH — ECONOMIC STABILITY: INCOME INSECURITY: HOW HARD IS IT FOR YOU TO PAY FOR THE VERY BASICS LIKE FOOD, HOUSING, MEDICAL CARE, AND HEATING?: NOT HARD AT ALL

## 2024-02-01 SDOH — ECONOMIC STABILITY: HOUSING INSECURITY
IN THE LAST 12 MONTHS, WAS THERE A TIME WHEN YOU DID NOT HAVE A STEADY PLACE TO SLEEP OR SLEPT IN A SHELTER (INCLUDING NOW)?: NO

## 2024-02-01 SDOH — ECONOMIC STABILITY: FOOD INSECURITY: WITHIN THE PAST 12 MONTHS, YOU WORRIED THAT YOUR FOOD WOULD RUN OUT BEFORE YOU GOT MONEY TO BUY MORE.: NEVER TRUE

## 2024-02-01 NOTE — PATIENT INSTRUCTIONS
[facility] Locations - No appointment necessary.  ? indicates the location is open Saturdays in addition to Monday through Friday.   Call your preferred location for test preparation, business hours and other information you need.   [facility] accepts all insurances.  [place]  [place]  [place]    ? [place]   [address]   [address]   [place] [address]    Ph: [phone]  [facility]   [address] [place] [address]    Ph: [phone]   ? [place]   [address]    [place] [address]    Ph: [phone]     [facility]   [address] [place] [address]    Ph: [phone] ? [address] [place] [address]   Ph: [phone]  ? [facility]   [address] [place] [address]    Ph: [phone]      [place]   [address] [place] [address]   Ph: [phone]    [place]    ? [facility]   [address] [place] [address]    Ph: [phone]  [facility]   [address] [place] [address]   Ph: [phone]  [place]   [address] [facility] [address], [address]    PH: [phone]    [facility]   [address] [place] [address]    Ph: [phone]  [place]  [address] [place] [address]  Ph: [phone]  [facility]   [address] [place] [address]    Ph: [phone]

## 2024-02-01 NOTE — PROGRESS NOTES
[facility]  [facility]  [address] [place] [id number]  Dept: [phone]  Dept Fax: [phone]     2/1/2024      Jeff Borges   1980     Chief Complaint   Patient presents with    Annual Exam       HPI  Pt comes in today for physical. Have not seen him in 1.5 years, which at that time he was dealing with severe anemia from bleeding hemorrhoids. He had hemorrhoids banded by GI and has since been working in restoring iron/hemoglobin levels with IV iron treatments under care of Hematology. He was seen in Jan 2024 most recently with normal CBC/iron levels and is to see them q6-9 months. He has noted minimal issues with the hemorrhoids. He is otherwise doing well, but does report issues sleeping over last 4-6 months, worse in last 1 month. Can fall asleep, but waking up (for no particular reason) multiple times. Has tried some OTC melatonin, but did not really help. Of note, he does take Prazosin [facility] for night[facility], which these have been stable.        1/29/2024    11:06 AM 5/3/2022     4:22 PM   PHQ Scores   PHQ2 Score 2 0   PHQ9 Score 2 0     Interpretation of Total Score Depression Severity: 1-4 = Minimal depression, 5-9 = Mild depression, 10-14 = Moderate depression, 15-19 = Moderately severe depression, 20-27 = Severe depression     Prior to Visit Medications    Medication Sig Taking? Authorizing Provider   ZOLMitriptan (ZOMIG) 5 MG tablet Take 1 tablet by mouth as needed for Migraine Yes Carola Radford MD   sertraline (ZOLOFT) 100 MG tablet Take 100 mg by mouth daily Yes Carola Radford MD   prazosin (MINIPRESS) 1 MG capsule Take 1 mg by mouth nightly Yes Carola Radford MD   fluticasone (FLONASE) 50 MCG/ACT nasal spray 2 sprays by Nasal route 2 times daily Yes Carola Radford MD   loratadine (CLARITIN) 10 MG tablet Take 10 mg by mouth daily Yes Carola Radford MD       Past Medical History:   Diagnosis Date

## 2024-02-02 PROBLEM — Z86.2 HISTORY OF ANEMIA: Status: ACTIVE | Noted: 2022-05-18

## 2024-02-02 ASSESSMENT — ENCOUNTER SYMPTOMS
ABDOMINAL PAIN: 0
SORE THROAT: 0
SHORTNESS OF BREATH: 0
COUGH: 0
NAUSEA: 0

## 2024-03-01 DIAGNOSIS — H93.13 TINNITUS OF BOTH EARS: Primary | ICD-10-CM

## 2024-03-05 ENCOUNTER — PROCEDURE VISIT (OUTPATIENT)
Dept: AUDIOLOGY | Age: 44
End: 2024-03-05
Payer: OTHER GOVERNMENT

## 2024-03-05 ENCOUNTER — OFFICE VISIT (OUTPATIENT)
Dept: ENT CLINIC | Age: 44
End: 2024-03-05
Payer: OTHER GOVERNMENT

## 2024-03-05 VITALS
RESPIRATION RATE: 16 BRPM | HEIGHT: 74 IN | BODY MASS INDEX: 32.08 KG/M2 | TEMPERATURE: 97.9 F | DIASTOLIC BLOOD PRESSURE: 84 MMHG | HEART RATE: 73 BPM | WEIGHT: 250 LBS | SYSTOLIC BLOOD PRESSURE: 132 MMHG

## 2024-03-05 DIAGNOSIS — Z01.10 ENCOUNTER FOR EXAMINATION OF EARS AND HEARING WITHOUT ABNORMAL FINDINGS: ICD-10-CM

## 2024-03-05 DIAGNOSIS — H93.13 TINNITUS OF BOTH EARS: Primary | ICD-10-CM

## 2024-03-05 DIAGNOSIS — Z01.10 NORMAL HEARING TEST OF BOTH EARS: ICD-10-CM

## 2024-03-05 PROCEDURE — 92567 TYMPANOMETRY: CPT | Performed by: AUDIOLOGIST

## 2024-03-05 PROCEDURE — 99203 OFFICE O/P NEW LOW 30 MIN: CPT | Performed by: OTOLARYNGOLOGY

## 2024-03-05 PROCEDURE — 92557 COMPREHENSIVE HEARING TEST: CPT | Performed by: AUDIOLOGIST

## 2024-03-05 ASSESSMENT — ENCOUNTER SYMPTOMS
EYE DISCHARGE: 0
SINUS PRESSURE: 0
FACIAL SWELLING: 0
EYE ITCHING: 0
COLOR CHANGE: 0
VOICE CHANGE: 0
BLOOD IN STOOL: 0
SINUS PAIN: 0
COUGH: 0
WHEEZING: 0
DIARRHEA: 0
SHORTNESS OF BREATH: 0
VOMITING: 0
CHOKING: 0
STRIDOR: 0
PHOTOPHOBIA: 0
TROUBLE SWALLOWING: 0
CONSTIPATION: 0
BACK PAIN: 0
RHINORRHEA: 0
SORE THROAT: 0
NAUSEA: 0

## 2024-03-05 NOTE — PROGRESS NOTES
Jeff Borges   1980, 43 y.o. male   [id number]       Referring Provider: Houston Cook DO  Referral Type: In an order in Epic    Reason for Visit: Evaluation of suspected change in hearing, tinnitus, or balance.      ADULT AUDIOLOGIC EVALUATION      Jeff Borges is a 43 y.o. male seen today, 3/5/2024, for an initial audiologic evaluation.      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      Jeff Borges denied otalgia, aural fullness, otorrhea, dizziness, imbalance, history of ear surgery, and family history of hearing loss.    IMPRESSIONS:       Today's results are consistent with hearing sensitivity within normal limits with normal middle ear function and excellent word recognition for soft conversational speech bilaterally.  Discussed tinnitus management strategies and safe listening levels.  Follow medical recommendations from Dr. Cook.    ASSESSMENT AND FINDINGS:       Otoscopy revealed: Clear ear canals bilaterally      RIGHT EAR:  Hearing Sensitivity: Within normal limits.  Speech Recognition Threshold: 20 dBHL  Word Recognition: Excellent (96%), based on NU-6 25-word list at 45 dBHL using recorded speech stimuli.    Tympanometry: Normal peak pressure and compliance, Type A tympanogram, consistent with normal middle ear function.      LEFT EAR:  Hearing Sensitivity: Within normal limits.  Speech Recognition Threshold: 20 dBHL  Word Recognition: Excellent (96%), based on NU-6 25-word list at 45 dBHL using recorded speech stimuli.    Tympanometry: Normal peak pressure and compliance, Type A tympanogram, consistent with normal middle ear function.      Reliability: Good  Transducer: Inserts    See scanned audiogram dated 3/5/2024 for results.      PATIENT EDUCATION: